# Patient Record
Sex: MALE | Race: WHITE | Employment: FULL TIME | URBAN - METROPOLITAN AREA
[De-identification: names, ages, dates, MRNs, and addresses within clinical notes are randomized per-mention and may not be internally consistent; named-entity substitution may affect disease eponyms.]

---

## 2019-07-01 ENCOUNTER — OFFICE VISIT (OUTPATIENT)
Dept: INTEGRATIVE MEDICINE | Facility: CLINIC | Age: 44
End: 2019-07-01
Payer: COMMERCIAL

## 2019-07-01 DIAGNOSIS — Z71.3 NUTRITIONAL COUNSELING: ICD-10-CM

## 2019-07-01 PROCEDURE — 97802 MEDICAL NUTRITION INDIV IN: CPT | Performed by: NUTRITIONIST

## 2019-07-01 NOTE — PATIENT INSTRUCTIONS
The products and items listed below (the “Products”)  and their claims have not been evaluated by the Food and Drug Administration. Dietary products are not intended to treat, prevent, mitigate or cure disease.  Ultimately, you must draw your own conclusi OR    At least 64-75 ounces of water a day    *If wanting to change the taste of water, infuse with fresh or frozen fruits or vegetables  ·  jessi, limes, berries, mint, etc  _____________  Use avocado oils for high heat cooking and for dressings use oliv

## 2019-07-02 NOTE — PROGRESS NOTES
Patient referred by Dr. Emily Braun  Did patient complete Nutritional Therapy Questionnaire? Fanta Valdez is a 38year old male presenting for nutritional counseling in regards to weight loss and optimizing health.   For the past 6 months, merlyn \"zero\" ericka to help monitor in addition to set reminders for eat times. Encourage proper hydration as well. Continue gluten free lifestyle due to celiac disease.       Plan and Future Considerations:   Patient aware of risks and benefits and consented to